# Patient Record
Sex: FEMALE | Race: WHITE | Employment: UNEMPLOYED | URBAN - NONMETROPOLITAN AREA
[De-identification: names, ages, dates, MRNs, and addresses within clinical notes are randomized per-mention and may not be internally consistent; named-entity substitution may affect disease eponyms.]

---

## 2021-06-15 ENCOUNTER — HOSPITAL ENCOUNTER (OUTPATIENT)
Dept: GENERAL RADIOLOGY | Age: 66
Discharge: HOME | End: 2021-06-15
Payer: COMMERCIAL

## 2021-06-15 VITALS
BODY MASS INDEX: 23.32 KG/M2 | RESPIRATION RATE: 18 BRPM | DIASTOLIC BLOOD PRESSURE: 59 MMHG | WEIGHT: 140 LBS | HEART RATE: 59 BPM | HEIGHT: 65 IN | SYSTOLIC BLOOD PRESSURE: 138 MMHG | OXYGEN SATURATION: 100 %

## 2021-06-15 DIAGNOSIS — F02.80 ALZHEIMER'S DISEASE (HCC): ICD-10-CM

## 2021-06-15 DIAGNOSIS — G30.9 ALZHEIMER'S DISEASE (HCC): ICD-10-CM

## 2021-06-15 LAB
ANION GAP SERPL CALCULATED.3IONS-SCNC: 10 MMOL/L (ref 7–19)
APPEARANCE CSF: CLEAR
BUN BLDV-MCNC: 11 MG/DL (ref 8–23)
CALCIUM SERPL-MCNC: 9.1 MG/DL (ref 8.8–10.2)
CHLORIDE BLD-SCNC: 102 MMOL/L (ref 98–111)
CLOT EVALUATION CSF: NORMAL
CO2: 26 MMOL/L (ref 22–29)
COLOR CSF: COLORLESS
CREAT SERPL-MCNC: 0.8 MG/DL (ref 0.5–0.9)
CRYPTOCOCCUS NEOFORMANS/GATTII CSF BY PCR: NOT DETECTED
CYTOMEGALOVIRUS CSF BY PCR: NOT DETECTED
ENTEROVIRUS CSF BY PCR: NOT DETECTED
ESCHERICHIA COLI K1 CSF BY PCR: NOT DETECTED
GFR AFRICAN AMERICAN: >59
GFR NON-AFRICAN AMERICAN: >60
GLUCOSE BLD-MCNC: 89 MG/DL (ref 74–109)
GLUCOSE, CSF: 54 MG/DL (ref 40–70)
HAEMOPHILUS INFLUENZAE CSF BY PCR: NOT DETECTED
HERPES SIMPLEX VIRUS 1 CSF BY PCR: NOT DETECTED
HERPES SIMPLEX VIRUS 2 CSF BY PCR: NOT DETECTED
HUMAN HERPESVIRUS 6 CSF BY PCR: NOT DETECTED
HUMAN PARECHOVIRUS CSF BY PCR: NOT DETECTED
INR BLD: 0.97 (ref 0.88–1.18)
LISTERIA MONOCYTOGENES CSF BY PCR: NOT DETECTED
NEISSERIA MENINGITIDIS CSF BY PCR: NOT DETECTED
PLATELET # BLD: 249 K/UL (ref 130–400)
POTASSIUM SERPL-SCNC: 4 MMOL/L (ref 3.5–5)
PROTEIN CSF: 89 MG/DL (ref 15–45)
PROTHROMBIN TIME: 12.8 SEC (ref 12–14.6)
RBC CSF: 0 /CUMM (ref 0–5)
SODIUM BLD-SCNC: 138 MMOL/L (ref 136–145)
STREPTOCOCCUS AGALACTIAE CSF BY PCR: NOT DETECTED
STREPTOCOCCUS PNEUMONIAE CSF BY PCR: NOT DETECTED
TUBE NUMBER CSF: NORMAL
VARICELLA ZOSTER VIRUS CSF BY PCR: NOT DETECTED
WBC CSF: 1 /CUMM (ref 0–8)

## 2021-06-15 PROCEDURE — 83916 OLIGOCLONAL BANDS: CPT

## 2021-06-15 PROCEDURE — 84157 ASSAY OF PROTEIN OTHER: CPT

## 2021-06-15 PROCEDURE — 62328 DX LMBR SPI PNXR W/FLUOR/CT: CPT

## 2021-06-15 PROCEDURE — 84155 ASSAY OF PROTEIN SERUM: CPT

## 2021-06-15 PROCEDURE — 82040 ASSAY OF SERUM ALBUMIN: CPT

## 2021-06-15 PROCEDURE — 82945 GLUCOSE OTHER FLUID: CPT

## 2021-06-15 PROCEDURE — 82784 ASSAY IGA/IGD/IGG/IGM EACH: CPT

## 2021-06-15 PROCEDURE — 85610 PROTHROMBIN TIME: CPT

## 2021-06-15 PROCEDURE — 87483 CNS DNA AMP PROBE TYPE 12-25: CPT

## 2021-06-15 PROCEDURE — 82042 OTHER SOURCE ALBUMIN QUAN EA: CPT

## 2021-06-15 PROCEDURE — 85049 AUTOMATED PLATELET COUNT: CPT

## 2021-06-15 PROCEDURE — 80048 BASIC METABOLIC PNL TOTAL CA: CPT

## 2021-06-15 PROCEDURE — 84165 PROTEIN E-PHORESIS SERUM: CPT

## 2021-06-15 PROCEDURE — 86592 SYPHILIS TEST NON-TREP QUAL: CPT

## 2021-06-15 PROCEDURE — 89050 BODY FLUID CELL COUNT: CPT

## 2021-06-15 RX ORDER — MELOXICAM 7.5 MG/1
7.5 TABLET ORAL DAILY
COMMUNITY

## 2021-06-15 RX ORDER — MEMANTINE HYDROCHLORIDE 10 MG/1
10 TABLET ORAL 2 TIMES DAILY
COMMUNITY

## 2021-06-15 RX ORDER — PANTOPRAZOLE SODIUM 20 MG/1
20 TABLET, DELAYED RELEASE ORAL DAILY
COMMUNITY

## 2021-06-15 RX ORDER — MIRTAZAPINE 15 MG/1
15 TABLET, FILM COATED ORAL NIGHTLY
COMMUNITY

## 2021-06-15 RX ORDER — SULFAMETHOXAZOLE AND TRIMETHOPRIM 800; 160 MG/1; MG/1
1 TABLET ORAL 2 TIMES DAILY
COMMUNITY

## 2021-06-15 RX ORDER — MEMANTINE HYDROCHLORIDE 5 MG/1
5 TABLET ORAL 2 TIMES DAILY
COMMUNITY

## 2021-06-15 RX ORDER — TRAZODONE HYDROCHLORIDE 100 MG/1
100 TABLET ORAL NIGHTLY
COMMUNITY

## 2021-06-15 RX ORDER — OXYCODONE AND ACETAMINOPHEN 10; 325 MG/1; MG/1
1 TABLET ORAL EVERY 4 HOURS PRN
COMMUNITY

## 2021-06-15 RX ORDER — BUSPIRONE HYDROCHLORIDE 5 MG/1
5 TABLET ORAL DAILY
COMMUNITY

## 2021-06-15 RX ORDER — PANTOPRAZOLE SODIUM 40 MG/1
40 GRANULE, DELAYED RELEASE ORAL
COMMUNITY

## 2021-06-15 RX ORDER — TIZANIDINE 4 MG/1
4 TABLET ORAL EVERY 6 HOURS PRN
COMMUNITY

## 2021-06-15 RX ORDER — SERTRALINE HYDROCHLORIDE 100 MG/1
100 TABLET, FILM COATED ORAL 2 TIMES DAILY
COMMUNITY

## 2021-06-15 RX ORDER — DEXTROMETHORPHAN HYDROBROMIDE AND QUINIDINE SULFATE 20; 10 MG/1; MG/1
1 CAPSULE, GELATIN COATED ORAL DAILY
COMMUNITY

## 2021-06-15 RX ORDER — MEMANTINE HYDROCHLORIDE AND DONEPEZIL HYDROCHLORIDE 28; 10 MG/1; MG/1
CAPSULE ORAL
COMMUNITY

## 2021-06-15 RX ORDER — DONEPEZIL HYDROCHLORIDE 10 MG/1
10 TABLET, FILM COATED ORAL NIGHTLY
COMMUNITY

## 2021-06-15 NOTE — PROGRESS NOTES
Patient tolerated the procedure well. Opening pressure was reported as 19. Patient denies pain or headache. Spouse at bedside. Puncture site clean and dry with no bleeding or hematoma present. Discharge instructions reviewed with the patient and her spouse and both verbalized understanding of instruction.

## 2021-06-18 LAB
ALBUMIN CSF: 25 MG/DL (ref 0–35)
ALBUMIN INDEX: 6.5 RATIO (ref 0–9)
ALBUMIN, SERUM BY NEPHELOMETRY: 3831 MG/DL (ref 3500–5200)
IGG CSF: 3.8 MG/DL (ref 0–6)
IGG INDEX: 0.5 RATIO (ref 0.28–0.66)
IGG SYNTHESIS RATE CSF: <0 MG/D
IGG/ALBUMIN CSF: 0.15 RATIO (ref 0.09–0.25)
IGG: 1165 MG/DL (ref 768–1632)
MULTIPLE SCLEROSIS PANEL: NORMAL
OLIGOCLONAL BANDS CSF: NEGATIVE
OLIGOCLONAL BANDS NUMBER: NORMAL BANDS (ref 0–1)
VDRL CSF SCREEN: NON REACTIVE

## 2021-06-19 LAB
ALBUMIN SERPL-MCNC: 4.05 G/DL (ref 3.75–5.01)
ALPHA-1-GLOBULIN: 0.37 G/DL (ref 0.19–0.46)
ALPHA-2-GLOBULIN: 0.9 G/DL (ref 0.48–1.05)
BETA GLOBULIN: 0.95 G/DL (ref 0.48–1.1)
GAMMA GLOBULIN: 1.14 G/DL (ref 0.62–1.51)
PROTEIN ELECTROPHORESIS, SERUM: NORMAL
SPE/IFE INTERPRETATION: NORMAL
TOTAL PROTEIN: 7.4 G/DL (ref 6.3–8.2)

## 2021-07-14 LAB
Lab: NORMAL
REPORT: NORMAL
THIS TEST SENT TO: NORMAL